# Patient Record
Sex: MALE | Race: WHITE | HISPANIC OR LATINO
[De-identification: names, ages, dates, MRNs, and addresses within clinical notes are randomized per-mention and may not be internally consistent; named-entity substitution may affect disease eponyms.]

---

## 2017-03-07 PROBLEM — Z00.00 ENCOUNTER FOR PREVENTIVE HEALTH EXAMINATION: Status: ACTIVE | Noted: 2017-03-07

## 2017-04-12 ENCOUNTER — NON-APPOINTMENT (OUTPATIENT)
Age: 67
End: 2017-04-12

## 2017-04-12 ENCOUNTER — APPOINTMENT (OUTPATIENT)
Dept: CARDIOLOGY | Facility: CLINIC | Age: 67
End: 2017-04-12

## 2017-04-12 VITALS
BODY MASS INDEX: 25.16 KG/M2 | SYSTOLIC BLOOD PRESSURE: 153 MMHG | RESPIRATION RATE: 17 BRPM | DIASTOLIC BLOOD PRESSURE: 96 MMHG | OXYGEN SATURATION: 96 % | HEIGHT: 68 IN | WEIGHT: 166 LBS | HEART RATE: 70 BPM

## 2017-04-12 VITALS — SYSTOLIC BLOOD PRESSURE: 134 MMHG | DIASTOLIC BLOOD PRESSURE: 80 MMHG

## 2017-04-12 DIAGNOSIS — F17.200 NICOTINE DEPENDENCE, UNSPECIFIED, UNCOMPLICATED: ICD-10-CM

## 2017-04-12 DIAGNOSIS — Z86.39 PERSONAL HISTORY OF OTHER ENDOCRINE, NUTRITIONAL AND METABOLIC DISEASE: ICD-10-CM

## 2017-04-12 DIAGNOSIS — L40.9 PSORIASIS, UNSPECIFIED: ICD-10-CM

## 2017-04-21 ENCOUNTER — APPOINTMENT (OUTPATIENT)
Dept: CARDIOLOGY | Facility: CLINIC | Age: 67
End: 2017-04-21

## 2017-04-26 ENCOUNTER — APPOINTMENT (OUTPATIENT)
Dept: CARDIOLOGY | Facility: CLINIC | Age: 67
End: 2017-04-26

## 2017-05-05 ENCOUNTER — APPOINTMENT (OUTPATIENT)
Dept: CARDIOLOGY | Facility: CLINIC | Age: 67
End: 2017-05-05

## 2017-07-18 ENCOUNTER — RX RENEWAL (OUTPATIENT)
Age: 67
End: 2017-07-18

## 2017-08-13 LAB
25(OH)D3 SERPL-MCNC: 33.8 NG/ML
ALBUMIN SERPL ELPH-MCNC: 4.3 G/DL
ALP BLD-CCNC: 82 U/L
ALT SERPL-CCNC: 22 U/L
ANION GAP SERPL CALC-SCNC: 20 MMOL/L
AST SERPL-CCNC: 20 U/L
BASOPHILS # BLD AUTO: 0.04 K/UL
BASOPHILS NFR BLD AUTO: 0.5 %
BILIRUB SERPL-MCNC: 1 MG/DL
BUN SERPL-MCNC: 19 MG/DL
CALCIUM SERPL-MCNC: 10 MG/DL
CHLORIDE SERPL-SCNC: 101 MMOL/L
CHOLEST SERPL-MCNC: 186 MG/DL
CHOLEST/HDLC SERPL: 4.3 RATIO
CO2 SERPL-SCNC: 23 MMOL/L
CREAT SERPL-MCNC: 1.29 MG/DL
EOSINOPHIL # BLD AUTO: 0.17 K/UL
EOSINOPHIL NFR BLD AUTO: 2 %
FOLATE SERPL-MCNC: 5.4 NG/ML
GLUCOSE SERPL-MCNC: 93 MG/DL
HBA1C MFR BLD HPLC: 6.2 %
HCT VFR BLD CALC: 49.1 %
HDLC SERPL-MCNC: 43 MG/DL
HGB BLD-MCNC: 16.6 G/DL
IMM GRANULOCYTES NFR BLD AUTO: 0.2 %
LDLC SERPL CALC-MCNC: 119 MG/DL
LYMPHOCYTES # BLD AUTO: 2.1 K/UL
LYMPHOCYTES NFR BLD AUTO: 24.5 %
MAN DIFF?: NORMAL
MCHC RBC-ENTMCNC: 29.7 PG
MCHC RBC-ENTMCNC: 33.8 GM/DL
MCV RBC AUTO: 87.8 FL
MONOCYTES # BLD AUTO: 0.72 K/UL
MONOCYTES NFR BLD AUTO: 8.4 %
NEUTROPHILS # BLD AUTO: 5.52 K/UL
NEUTROPHILS NFR BLD AUTO: 64.4 %
PLATELET # BLD AUTO: 218 K/UL
POTASSIUM SERPL-SCNC: 5 MMOL/L
PROT SERPL-MCNC: 7.1 G/DL
RBC # BLD: 5.59 M/UL
RBC # FLD: 14 %
SODIUM SERPL-SCNC: 144 MMOL/L
TRIGL SERPL-MCNC: 121 MG/DL
TSH SERPL-ACNC: 2.36 UIU/ML
VIT B12 SERPL-MCNC: 682 PG/ML
WBC # FLD AUTO: 8.57 K/UL

## 2017-08-16 ENCOUNTER — NON-APPOINTMENT (OUTPATIENT)
Age: 67
End: 2017-08-16

## 2017-08-16 ENCOUNTER — APPOINTMENT (OUTPATIENT)
Dept: CARDIOLOGY | Facility: CLINIC | Age: 67
End: 2017-08-16
Payer: MEDICARE

## 2017-08-16 VITALS
HEIGHT: 68 IN | DIASTOLIC BLOOD PRESSURE: 79 MMHG | WEIGHT: 168 LBS | SYSTOLIC BLOOD PRESSURE: 145 MMHG | OXYGEN SATURATION: 96 % | RESPIRATION RATE: 17 BRPM | TEMPERATURE: 97.9 F | BODY MASS INDEX: 25.46 KG/M2 | HEART RATE: 71 BPM

## 2017-08-16 DIAGNOSIS — R00.2 PALPITATIONS: ICD-10-CM

## 2017-08-16 PROCEDURE — 93000 ELECTROCARDIOGRAM COMPLETE: CPT

## 2017-08-16 PROCEDURE — 99215 OFFICE O/P EST HI 40 MIN: CPT

## 2017-10-25 ENCOUNTER — RX RENEWAL (OUTPATIENT)
Age: 67
End: 2017-10-25

## 2017-11-20 ENCOUNTER — MEDICATION RENEWAL (OUTPATIENT)
Age: 67
End: 2017-11-20

## 2017-12-18 ENCOUNTER — RX RENEWAL (OUTPATIENT)
Age: 67
End: 2017-12-18

## 2018-08-01 ENCOUNTER — RX RENEWAL (OUTPATIENT)
Age: 68
End: 2018-08-01

## 2018-11-05 ENCOUNTER — RX RENEWAL (OUTPATIENT)
Age: 68
End: 2018-11-05

## 2019-01-08 ENCOUNTER — RX RENEWAL (OUTPATIENT)
Age: 69
End: 2019-01-08

## 2019-03-06 ENCOUNTER — APPOINTMENT (OUTPATIENT)
Dept: CARDIOLOGY | Facility: CLINIC | Age: 69
End: 2019-03-06
Payer: MEDICARE

## 2019-03-06 ENCOUNTER — NON-APPOINTMENT (OUTPATIENT)
Age: 69
End: 2019-03-06

## 2019-03-06 VITALS
HEART RATE: 61 BPM | WEIGHT: 158 LBS | RESPIRATION RATE: 17 BRPM | HEIGHT: 68 IN | SYSTOLIC BLOOD PRESSURE: 133 MMHG | DIASTOLIC BLOOD PRESSURE: 80 MMHG | BODY MASS INDEX: 23.95 KG/M2 | TEMPERATURE: 97.9 F | OXYGEN SATURATION: 100 %

## 2019-03-06 PROCEDURE — 93000 ELECTROCARDIOGRAM COMPLETE: CPT

## 2019-03-06 PROCEDURE — 99214 OFFICE O/P EST MOD 30 MIN: CPT

## 2019-03-06 NOTE — DISCUSSION/SUMMARY
[FreeTextEntry1] : 68 year man with a history as listed presents for an initial cardiac evaluation. \par Srinivasan is complaining of dyspnea on exertion. He never got his ischemic workup from before.  His EKG did not reveal any significant ischemic changes. \par He will get a 2d echo to rule out underlying structural heart disease. He will undergo a nuclear stress test to rule out underlying CAD. \par Continue with Plavix. He takes ASA 3 x a week. \par His blood pressure is borderline. He will continue with Atenolol 25mg q12 for now. He is very reluctant to start any new medications. \par Continue his Lipitor 40mg HS. Goal LDL<70. \par He will have his baseline lab work, including lipids, done prior to the next visit. \par Check for progression of carotid disease. \par Exercise and diet counseling was performed in order to reduce her future cardiovascular risk. \par Smoking cessation counseling was performed. \par He will followup with me in 3 months  or sooner if necessary. \par

## 2019-03-06 NOTE — PHYSICAL EXAM
[Well Groomed] : well groomed [General Appearance - In No Acute Distress] : no acute distress [Normal Conjunctiva] : the conjunctiva exhibited no abnormalities [Eyelids - No Xanthelasma] : the eyelids demonstrated no xanthelasmas [Normal Oral Mucosa] : normal oral mucosa [No Oral Pallor] : no oral pallor [No Oral Cyanosis] : no oral cyanosis [Respiration, Rhythm And Depth] : normal respiratory rhythm and effort [Exaggerated Use Of Accessory Muscles For Inspiration] : no accessory muscle use [Auscultation Breath Sounds / Voice Sounds] : lungs were clear to auscultation bilaterally [Abdomen Soft] : soft [Abdomen Tenderness] : non-tender [Abdomen Mass (___ Cm)] : no abdominal mass palpated [Abnormal Walk] : normal gait [Gait - Sufficient For Exercise Testing] : the gait was sufficient for exercise testing [Nail Clubbing] : no clubbing of the fingernails [Cyanosis, Localized] : no localized cyanosis [Petechial Hemorrhages (___cm)] : no petechial hemorrhages [] : no ischemic changes [FreeTextEntry1] : psoriasis  [Oriented To Time, Place, And Person] : oriented to person, place, and time [Affect] : the affect was normal [Mood] : the mood was normal [No Anxiety] : not feeling anxious [Normal Appearance] : was normal in appearance [Neck Supple] : was supple [Diffusely Enlarged] : was not enlarged [Normal Rate] : normal [Rhythm Regular] : regular [Normal S1] : normal S1 [Normal S2] : normal S2 [No Gallop] : no gallop heard [No Murmur] : no murmurs heard [1+] : Carotid: right 1+ [Right Carotid Bruit] : no bruit heard over the right carotid [Left Carotid Bruit] : no bruit heard over the left carotid [2+] : left 2+ [Bruit] : no bruit heard [No Pitting Edema] : no pitting edema present
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
## 2019-03-07 ENCOUNTER — TRANSCRIPTION ENCOUNTER (OUTPATIENT)
Age: 69
End: 2019-03-07

## 2019-03-07 LAB
25(OH)D3 SERPL-MCNC: 24.6 NG/ML
ALBUMIN SERPL ELPH-MCNC: 4.7 G/DL
ALP BLD-CCNC: 78 U/L
ALT SERPL-CCNC: 19 U/L
ANION GAP SERPL CALC-SCNC: 13 MMOL/L
AST SERPL-CCNC: 20 U/L
BASOPHILS # BLD AUTO: 0.09 K/UL
BASOPHILS NFR BLD AUTO: 1.1 %
BILIRUB SERPL-MCNC: 0.9 MG/DL
BUN SERPL-MCNC: 14 MG/DL
CALCIUM SERPL-MCNC: 9.7 MG/DL
CHLORIDE SERPL-SCNC: 102 MMOL/L
CHOLEST SERPL-MCNC: 155 MG/DL
CHOLEST/HDLC SERPL: 3.4 RATIO
CO2 SERPL-SCNC: 25 MMOL/L
CREAT SERPL-MCNC: 0.91 MG/DL
EOSINOPHIL # BLD AUTO: 0.18 K/UL
EOSINOPHIL NFR BLD AUTO: 2.1 %
FOLATE SERPL-MCNC: 9.2 NG/ML
GLUCOSE SERPL-MCNC: 73 MG/DL
HBA1C MFR BLD HPLC: 6.2 %
HCT VFR BLD CALC: 48.6 %
HDLC SERPL-MCNC: 45 MG/DL
HGB BLD-MCNC: 16.4 G/DL
IMM GRANULOCYTES NFR BLD AUTO: 0.2 %
LDLC SERPL CALC-MCNC: 83 MG/DL
LYMPHOCYTES # BLD AUTO: 2.42 K/UL
LYMPHOCYTES NFR BLD AUTO: 28.8 %
MAGNESIUM SERPL-MCNC: 1.9 MG/DL
MAN DIFF?: NORMAL
MCHC RBC-ENTMCNC: 30.3 PG
MCHC RBC-ENTMCNC: 33.7 GM/DL
MCV RBC AUTO: 89.7 FL
MONOCYTES # BLD AUTO: 0.78 K/UL
MONOCYTES NFR BLD AUTO: 9.3 %
NEUTROPHILS # BLD AUTO: 4.91 K/UL
NEUTROPHILS NFR BLD AUTO: 58.5 %
PLATELET # BLD AUTO: 219 K/UL
POTASSIUM SERPL-SCNC: 5 MMOL/L
PROT SERPL-MCNC: 6.9 G/DL
RBC # BLD: 5.42 M/UL
RBC # FLD: 13 %
SODIUM SERPL-SCNC: 140 MMOL/L
TRIGL SERPL-MCNC: 135 MG/DL
TSH SERPL-ACNC: 2.08 UIU/ML
VIT B12 SERPL-MCNC: 563 PG/ML
WBC # FLD AUTO: 8.4 K/UL

## 2019-04-10 ENCOUNTER — MEDICATION RENEWAL (OUTPATIENT)
Age: 69
End: 2019-04-10

## 2019-06-05 ENCOUNTER — NON-APPOINTMENT (OUTPATIENT)
Age: 69
End: 2019-06-05

## 2019-06-05 ENCOUNTER — APPOINTMENT (OUTPATIENT)
Dept: CARDIOLOGY | Facility: CLINIC | Age: 69
End: 2019-06-05
Payer: MEDICARE

## 2019-06-05 VITALS
OXYGEN SATURATION: 98 % | HEART RATE: 57 BPM | BODY MASS INDEX: 23.49 KG/M2 | HEIGHT: 68 IN | WEIGHT: 155 LBS | SYSTOLIC BLOOD PRESSURE: 130 MMHG | DIASTOLIC BLOOD PRESSURE: 77 MMHG | TEMPERATURE: 98.1 F | RESPIRATION RATE: 17 BRPM

## 2019-06-05 VITALS — DIASTOLIC BLOOD PRESSURE: 70 MMHG | SYSTOLIC BLOOD PRESSURE: 128 MMHG

## 2019-06-05 PROCEDURE — 93000 ELECTROCARDIOGRAM COMPLETE: CPT

## 2019-06-05 PROCEDURE — 99214 OFFICE O/P EST MOD 30 MIN: CPT
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
## 2019-06-05 NOTE — DISCUSSION/SUMMARY
[FreeTextEntry1] : 68 year man with a history as listed presents for an initial cardiac evaluation. \par Srinivasan is relatively doing well. He is still complaining of dyspnea on exertion which is likely related to deconditioning.  His EKG did not reveal any significant ischemic changes. He never got his ischemic workup from before. He will get a 2d echo to rule out underlying structural heart disease. He will undergo a nuclear stress test to rule out underlying CAD. \par Continue with Plavix.  He will stop ASA. \par His blood pressure is controlled. He will continue with Atenolol 25mg q12. \par Continue his Lipitor 40mg HS. Goal LDL<70. \par Exercise and diet counseling was performed in order to reduce her future cardiovascular risk. \par Smoking cessation counseling was performed. \par He will followup with me in 3 months  or sooner if necessary. \par

## 2019-06-05 NOTE — PHYSICAL EXAM
[Normal Conjunctiva] : the conjunctiva exhibited no abnormalities [Well Groomed] : well groomed [General Appearance - In No Acute Distress] : no acute distress [Eyelids - No Xanthelasma] : the eyelids demonstrated no xanthelasmas [Normal Oral Mucosa] : normal oral mucosa [No Oral Cyanosis] : no oral cyanosis [No Oral Pallor] : no oral pallor [Respiration, Rhythm And Depth] : normal respiratory rhythm and effort [Exaggerated Use Of Accessory Muscles For Inspiration] : no accessory muscle use [Auscultation Breath Sounds / Voice Sounds] : lungs were clear to auscultation bilaterally [Abdomen Soft] : soft [Abdomen Tenderness] : non-tender [Abdomen Mass (___ Cm)] : no abdominal mass palpated [Abnormal Walk] : normal gait [Gait - Sufficient For Exercise Testing] : the gait was sufficient for exercise testing [Cyanosis, Localized] : no localized cyanosis [Nail Clubbing] : no clubbing of the fingernails [Petechial Hemorrhages (___cm)] : no petechial hemorrhages [] : no ischemic changes [FreeTextEntry1] : psoriasis  [Oriented To Time, Place, And Person] : oriented to person, place, and time [Affect] : the affect was normal [Mood] : the mood was normal [No Anxiety] : not feeling anxious [Normal Rate] : normal [Rhythm Regular] : regular [Normal S2] : normal S2 [Normal S1] : normal S1 [No Gallop] : no gallop heard [No Murmur] : no murmurs heard [1+] : Carotid: right 1+ [Right Carotid Bruit] : no bruit heard over the right carotid [Left Carotid Bruit] : no bruit heard over the left carotid [2+] : right 2+ [No Pitting Edema] : no pitting edema present [Bruit] : no bruit heard

## 2019-06-19 ENCOUNTER — APPOINTMENT (OUTPATIENT)
Dept: CARDIOLOGY | Facility: CLINIC | Age: 69
End: 2019-06-19
Payer: MEDICARE

## 2019-06-19 PROCEDURE — 93306 TTE W/DOPPLER COMPLETE: CPT

## 2019-06-24 ENCOUNTER — APPOINTMENT (OUTPATIENT)
Dept: CARDIOLOGY | Facility: CLINIC | Age: 69
End: 2019-06-24

## 2019-09-18 ENCOUNTER — APPOINTMENT (OUTPATIENT)
Dept: CARDIOLOGY | Facility: CLINIC | Age: 69
End: 2019-09-18
Payer: MEDICARE

## 2019-09-18 ENCOUNTER — NON-APPOINTMENT (OUTPATIENT)
Age: 69
End: 2019-09-18

## 2019-09-18 VITALS — DIASTOLIC BLOOD PRESSURE: 78 MMHG | SYSTOLIC BLOOD PRESSURE: 124 MMHG

## 2019-09-18 VITALS
SYSTOLIC BLOOD PRESSURE: 136 MMHG | DIASTOLIC BLOOD PRESSURE: 80 MMHG | HEIGHT: 68 IN | HEART RATE: 50 BPM | BODY MASS INDEX: 24.25 KG/M2 | WEIGHT: 160 LBS | OXYGEN SATURATION: 98 %

## 2019-09-18 PROCEDURE — 99214 OFFICE O/P EST MOD 30 MIN: CPT

## 2019-09-18 PROCEDURE — 93000 ELECTROCARDIOGRAM COMPLETE: CPT

## 2019-09-18 NOTE — HISTORY OF PRESENT ILLNESS
[FreeTextEntry1] : 68 year old man with a history CAd s/p MI in 1990 s/p TPA, s/p PCI 1991 at [facility] in [facility], 2006 at [facility] with PCI to LAD,  closed RCA, carotid disease, HTN, HLD, tobacco. \par He had an echo in 6/19/2019  that showed normal systolic LV function without any significant other findings, including no significant valvular disease. \par \par He is here for a followup visit. He was last here in 6/2019.\par Since his last visit he has been feeling well. He has not gotten his stress test. \par  \par He still complains of intermittent anxiety that overtakes him. He notes a fluttering for a seconds during that  This seems to happen every few months which has improved. \par  He complains of dyspnea on exertion with carrying objects upstairs.  He denies any chest pain, PND, orthopnea, lower extremity edema, near syncope, syncope, strokelike symptoms.  \par He is complaint with his medications.

## 2019-09-18 NOTE — DISCUSSION/SUMMARY
[FreeTextEntry1] : 68 year man with a history as listed presents for an initial cardiac evaluation. \par Srinivasan is relatively doing well. He is still complaining of dyspnea on exertion which is likely related to deconditioning.  His EKG did not reveal any significant ischemic changes. He had an echo in 6/19/2019  that showed normal systolic LV function without any significant other findings, including no significant valvular disease. He never got his ischemic workup from before.  He has agreed to undergo a nuclear stress test to rule out underlying CAD. \par Continue with Plavix.  He will stop ASA. \par His blood pressure is controlled. He will continue with Atenolol 25mg q12. \par Continue his Lipitor 40mg HS. Goal LDL<70. \par Exercise and diet counseling was performed in order to reduce her future cardiovascular risk. \par Smoking cessation counseling was performed. \par He will followup with me in 3 months  or sooner if necessary. \par

## 2019-09-18 NOTE — PHYSICAL EXAM
[Well Groomed] : well groomed [Normal Conjunctiva] : the conjunctiva exhibited no abnormalities [General Appearance - In No Acute Distress] : no acute distress [Eyelids - No Xanthelasma] : the eyelids demonstrated no xanthelasmas [No Oral Pallor] : no oral pallor [Normal Oral Mucosa] : normal oral mucosa [No Oral Cyanosis] : no oral cyanosis [Respiration, Rhythm And Depth] : normal respiratory rhythm and effort [Auscultation Breath Sounds / Voice Sounds] : lungs were clear to auscultation bilaterally [Exaggerated Use Of Accessory Muscles For Inspiration] : no accessory muscle use [Abdomen Soft] : soft [Abdomen Tenderness] : non-tender [Abdomen Mass (___ Cm)] : no abdominal mass palpated [Abnormal Walk] : normal gait [Gait - Sufficient For Exercise Testing] : the gait was sufficient for exercise testing [Cyanosis, Localized] : no localized cyanosis [Nail Clubbing] : no clubbing of the fingernails [] : no ischemic changes [Petechial Hemorrhages (___cm)] : no petechial hemorrhages [FreeTextEntry1] : psoriasis  [Oriented To Time, Place, And Person] : oriented to person, place, and time [Mood] : the mood was normal [Affect] : the affect was normal [No Anxiety] : not feeling anxious [Normal Rate] : normal [Rhythm Regular] : regular [Normal S1] : normal S1 [Normal S2] : normal S2 [No Murmur] : no murmurs heard [No Gallop] : no gallop heard [1+] : Carotid: right 1+ [Left Carotid Bruit] : no bruit heard over the left carotid [Right Carotid Bruit] : no bruit heard over the right carotid [2+] : left 2+ [No Pitting Edema] : no pitting edema present [Bruit] : no bruit heard

## 2019-09-19 ENCOUNTER — CLINICAL ADVICE (OUTPATIENT)
Age: 69
End: 2019-09-19

## 2019-11-04 ENCOUNTER — RX RENEWAL (OUTPATIENT)
Age: 69
End: 2019-11-04

## 2020-04-07 ENCOUNTER — RX RENEWAL (OUTPATIENT)
Age: 70
End: 2020-04-07

## 2020-04-08 ENCOUNTER — RX RENEWAL (OUTPATIENT)
Age: 70
End: 2020-04-08

## 2020-05-08 ENCOUNTER — RX RENEWAL (OUTPATIENT)
Age: 70
End: 2020-05-08

## 2020-07-08 ENCOUNTER — TRANSCRIPTION ENCOUNTER (OUTPATIENT)
Age: 70
End: 2020-07-08

## 2020-10-29 ENCOUNTER — RX RENEWAL (OUTPATIENT)
Age: 70
End: 2020-10-29

## 2020-11-16 ENCOUNTER — RX RENEWAL (OUTPATIENT)
Age: 70
End: 2020-11-16

## 2021-01-20 ENCOUNTER — TRANSCRIPTION ENCOUNTER (OUTPATIENT)
Age: 71
End: 2021-01-20

## 2021-02-08 ENCOUNTER — RX RENEWAL (OUTPATIENT)
Age: 71
End: 2021-02-08

## 2021-02-09 ENCOUNTER — RX RENEWAL (OUTPATIENT)
Age: 71
End: 2021-02-09

## 2021-04-19 ENCOUNTER — RX RENEWAL (OUTPATIENT)
Age: 71
End: 2021-04-19

## 2021-05-05 ENCOUNTER — RX RENEWAL (OUTPATIENT)
Age: 71
End: 2021-05-05

## 2021-05-13 ENCOUNTER — NON-APPOINTMENT (OUTPATIENT)
Age: 71
End: 2021-05-13

## 2021-05-13 ENCOUNTER — APPOINTMENT (OUTPATIENT)
Dept: CARDIOLOGY | Facility: CLINIC | Age: 71
End: 2021-05-13
Payer: MEDICARE

## 2021-05-13 VITALS
SYSTOLIC BLOOD PRESSURE: 138 MMHG | DIASTOLIC BLOOD PRESSURE: 80 MMHG | HEIGHT: 68 IN | HEART RATE: 62 BPM | BODY MASS INDEX: 23.49 KG/M2 | WEIGHT: 155 LBS | OXYGEN SATURATION: 97 %

## 2021-05-13 PROCEDURE — 93000 ELECTROCARDIOGRAM COMPLETE: CPT

## 2021-05-13 PROCEDURE — 99214 OFFICE O/P EST MOD 30 MIN: CPT

## 2021-05-13 NOTE — CARDIOLOGY SUMMARY
[Normal] : normal [___] : [unfilled] [None] : normal LV function [de-identified] : SR [de-identified] : 6/2019 normal LV function.

## 2021-05-13 NOTE — DISCUSSION/SUMMARY
[FreeTextEntry1] : 70 year man with a history as listed presents for a followup cardiac evaluation. \par Srinivasan is relatively doing well. He is still complaining of dyspnea on exertion which is likely related to deconditioning.  His EKG did not reveal any significant ischemic changes. He will get a 2d echo to assess for any  new structural heart disease, changes in valvular and ventricular function. He will undergo a nuclear stress test to reassess his CAD. he will need his carotid duplex. \par Continue with Plavix.  He will stop ASA. \par His blood pressure is controlled. He will continue with Atenolol 25mg q12. \par Continue his Lipitor 40mg HS. Goal LDL<70. \par Exercise and diet counseling was performed in order to reduce her future cardiovascular risk. \par Smoking cessation counseling was performed. \par He will followup with me in 4-6 months  or sooner if necessary. \par

## 2021-05-13 NOTE — PHYSICAL EXAM
[Well Groomed] : well groomed [General Appearance - In No Acute Distress] : no acute distress [Normal Conjunctiva] : the conjunctiva exhibited no abnormalities [Eyelids - No Xanthelasma] : the eyelids demonstrated no xanthelasmas [Normal Oral Mucosa] : normal oral mucosa [No Oral Pallor] : no oral pallor [No Oral Cyanosis] : no oral cyanosis [Respiration, Rhythm And Depth] : normal respiratory rhythm and effort [Exaggerated Use Of Accessory Muscles For Inspiration] : no accessory muscle use [Auscultation Breath Sounds / Voice Sounds] : lungs were clear to auscultation bilaterally [Abdomen Soft] : soft [Abdomen Tenderness] : non-tender [Abdomen Mass (___ Cm)] : no abdominal mass palpated [Abnormal Walk] : normal gait [Gait - Sufficient For Exercise Testing] : the gait was sufficient for exercise testing [Nail Clubbing] : no clubbing of the fingernails [Cyanosis, Localized] : no localized cyanosis [Petechial Hemorrhages (___cm)] : no petechial hemorrhages [] : no ischemic changes [Oriented To Time, Place, And Person] : oriented to person, place, and time [Affect] : the affect was normal [Mood] : the mood was normal [No Anxiety] : not feeling anxious [Normal Rate] : normal [Rhythm Regular] : regular [Normal S1] : normal S1 [Normal S2] : normal S2 [No Gallop] : no gallop heard [No Murmur] : no murmurs heard [1+] : Carotid: right 1+ [2+] : left 2+ [No Pitting Edema] : no pitting edema present [FreeTextEntry1] : psoriasis  [Right Carotid Bruit] : no bruit heard over the right carotid [Left Carotid Bruit] : no bruit heard over the left carotid [Bruit] : no bruit heard

## 2021-05-13 NOTE — COUNSELING
[Risk of tobacco use and health benefits of smoking cessation discussed] : Risk of tobacco use and health benefits of smoking cessation discussed [Cessation strategies including cessation program discussed] : Cessation strategies including cessation program discussed [Willing to Quit Smoking] : Willing to quit smoking [FreeTextEntry1] : 3

## 2021-05-21 LAB
FOLATE SERPL-MCNC: 6.9 NG/ML
VIT B12 SERPL-MCNC: 499 PG/ML

## 2021-05-26 ENCOUNTER — RX RENEWAL (OUTPATIENT)
Age: 71
End: 2021-05-26

## 2021-06-02 LAB
25(OH)D3 SERPL-MCNC: 26.7 NG/ML
ALBUMIN SERPL ELPH-MCNC: 4.3 G/DL
ALP BLD-CCNC: 73 U/L
ALT SERPL-CCNC: 30 U/L
ANION GAP SERPL CALC-SCNC: 13 MMOL/L
AST SERPL-CCNC: 23 U/L
BASOPHILS # BLD AUTO: 0.08 K/UL
BASOPHILS NFR BLD AUTO: 1.1 %
BILIRUB SERPL-MCNC: 1 MG/DL
BUN SERPL-MCNC: 18 MG/DL
CALCIUM SERPL-MCNC: 9.4 MG/DL
CHLORIDE SERPL-SCNC: 104 MMOL/L
CHOLEST SERPL-MCNC: 138 MG/DL
CO2 SERPL-SCNC: 24 MMOL/L
CREAT SERPL-MCNC: 1.21 MG/DL
EOSINOPHIL # BLD AUTO: 0.17 K/UL
EOSINOPHIL NFR BLD AUTO: 2.4 %
ESTIMATED AVERAGE GLUCOSE: 131 MG/DL
GLUCOSE SERPL-MCNC: 114 MG/DL
HBA1C MFR BLD HPLC: 6.2 %
HCT VFR BLD CALC: 46.3 %
HDLC SERPL-MCNC: 38 MG/DL
HGB BLD-MCNC: 15.8 G/DL
IMM GRANULOCYTES NFR BLD AUTO: 0.1 %
LDLC SERPL CALC-MCNC: 80 MG/DL
LYMPHOCYTES # BLD AUTO: 1.79 K/UL
LYMPHOCYTES NFR BLD AUTO: 25.2 %
MAGNESIUM SERPL-MCNC: 2 MG/DL
MAN DIFF?: NORMAL
MCHC RBC-ENTMCNC: 29.6 PG
MCHC RBC-ENTMCNC: 34.1 GM/DL
MCV RBC AUTO: 86.9 FL
MONOCYTES # BLD AUTO: 0.63 K/UL
MONOCYTES NFR BLD AUTO: 8.9 %
NEUTROPHILS # BLD AUTO: 4.42 K/UL
NEUTROPHILS NFR BLD AUTO: 62.3 %
NONHDLC SERPL-MCNC: 100 MG/DL
PLATELET # BLD AUTO: 188 K/UL
POTASSIUM SERPL-SCNC: 4.6 MMOL/L
PROT SERPL-MCNC: 6.4 G/DL
PSA SERPL-MCNC: 1.69 NG/ML
RBC # BLD: 5.33 M/UL
RBC # FLD: 12.7 %
SODIUM SERPL-SCNC: 141 MMOL/L
TRIGL SERPL-MCNC: 102 MG/DL
TSH SERPL-ACNC: 1.59 UIU/ML
WBC # FLD AUTO: 7.1 K/UL

## 2021-07-13 ENCOUNTER — APPOINTMENT (OUTPATIENT)
Dept: CARDIOLOGY | Facility: CLINIC | Age: 71
End: 2021-07-13
Payer: MEDICARE

## 2021-07-13 PROCEDURE — 93306 TTE W/DOPPLER COMPLETE: CPT

## 2021-07-13 PROCEDURE — 93880 EXTRACRANIAL BILAT STUDY: CPT

## 2021-08-10 ENCOUNTER — APPOINTMENT (OUTPATIENT)
Dept: ORTHOPEDIC SURGERY | Facility: CLINIC | Age: 71
End: 2021-08-10
Payer: MEDICARE

## 2021-08-10 DIAGNOSIS — M65.341 TRIGGER FINGER, RIGHT RING FINGER: ICD-10-CM

## 2021-08-10 PROCEDURE — 99204 OFFICE O/P NEW MOD 45 MIN: CPT | Mod: 25

## 2021-08-10 PROCEDURE — 20550 NJX 1 TENDON SHEATH/LIGAMENT: CPT | Mod: F8

## 2021-09-30 ENCOUNTER — NON-APPOINTMENT (OUTPATIENT)
Age: 71
End: 2021-09-30

## 2021-09-30 ENCOUNTER — APPOINTMENT (OUTPATIENT)
Dept: CARDIOLOGY | Facility: CLINIC | Age: 71
End: 2021-09-30
Payer: MEDICARE

## 2021-09-30 VITALS
WEIGHT: 156 LBS | HEART RATE: 70 BPM | HEIGHT: 68 IN | DIASTOLIC BLOOD PRESSURE: 75 MMHG | OXYGEN SATURATION: 96 % | SYSTOLIC BLOOD PRESSURE: 161 MMHG | BODY MASS INDEX: 23.64 KG/M2

## 2021-09-30 VITALS — SYSTOLIC BLOOD PRESSURE: 124 MMHG | DIASTOLIC BLOOD PRESSURE: 70 MMHG

## 2021-09-30 PROCEDURE — 99214 OFFICE O/P EST MOD 30 MIN: CPT

## 2021-09-30 PROCEDURE — 93000 ELECTROCARDIOGRAM COMPLETE: CPT

## 2021-09-30 NOTE — CARDIOLOGY SUMMARY
[de-identified] : SR [de-identified] : 6/2019 normal LV function.\par 7/2021 normal LV function.  [de-identified] : 7/2021 carotid disease RCA severe atherosclerosis w 80-99%

## 2021-09-30 NOTE — DISCUSSION/SUMMARY
[FreeTextEntry1] : 70 year man with a history as listed presents for a followup cardiac evaluation. \par Srinivasan is relatively doing well. He is still complaining of dyspnea on exertion which is likely related to deconditioning.  His EKG did not reveal any significant ischemic changes. He is pending a nuclear stress test. \par I have refered him to a vascular surgeon for his carotid stenosis. \par Continue with Plavix.  He will stop ASA. \par His blood pressure is controlled. He will continue with Atenolol 25mg q12. \par Continue his Lipitor 40mg HS. Goal LDL<70. \par Exercise and diet counseling was performed in order to reduce her future cardiovascular risk. \par Smoking cessation counseling was performed. \par He will followup with me in  6 months  or sooner if necessary. \par

## 2021-09-30 NOTE — HISTORY OF PRESENT ILLNESS
[FreeTextEntry1] : 70 year old man with a history CAd s/p MI in 1990 s/p TPA, s/p PCI 1991 at [facility] [place], 2006 at [facility] with PCI to LAD,  closed RCA, carotid disease, HTN, HLD, tobacco. \par \par He is here for a followup visit. He was last here in 9/2019.\par Since his last visit he has been feeling well.  Though he has been more concerned about his daughter who is graduating the [employer].  \par He has been having a flair from his psoriasis. \par  he walks with his dogs. He complains of dyspnea on exertion with strenuous exertion.  He denies any chest pain, PND, orthopnea, lower extremity edema, near syncope, syncope, strokelike symptoms.  he is still dealing the health issues with his grandson. He is complaint with his medications.

## 2021-10-13 ENCOUNTER — APPOINTMENT (OUTPATIENT)
Dept: CARDIOLOGY | Facility: CLINIC | Age: 71
End: 2021-10-13

## 2022-01-25 ENCOUNTER — APPOINTMENT (OUTPATIENT)
Dept: CARDIOLOGY | Facility: CLINIC | Age: 72
End: 2022-01-25

## 2022-07-21 ENCOUNTER — APPOINTMENT (OUTPATIENT)
Dept: CARDIOLOGY | Facility: CLINIC | Age: 72
End: 2022-07-21

## 2022-07-21 ENCOUNTER — NON-APPOINTMENT (OUTPATIENT)
Age: 72
End: 2022-07-21

## 2022-07-21 VITALS
OXYGEN SATURATION: 97 % | BODY MASS INDEX: 22.13 KG/M2 | WEIGHT: 146 LBS | DIASTOLIC BLOOD PRESSURE: 75 MMHG | HEART RATE: 68 BPM | SYSTOLIC BLOOD PRESSURE: 130 MMHG | HEIGHT: 68 IN

## 2022-07-21 PROCEDURE — 99214 OFFICE O/P EST MOD 30 MIN: CPT

## 2022-07-21 PROCEDURE — 93000 ELECTROCARDIOGRAM COMPLETE: CPT

## 2022-07-21 NOTE — PHYSICAL EXAM
[Well Groomed] : well groomed [General Appearance - In No Acute Distress] : no acute distress [Normal Conjunctiva] : the conjunctiva exhibited no abnormalities [Eyelids - No Xanthelasma] : the eyelids demonstrated no xanthelasmas [Normal Oral Mucosa] : normal oral mucosa [No Oral Pallor] : no oral pallor [No Oral Cyanosis] : no oral cyanosis [Respiration, Rhythm And Depth] : normal respiratory rhythm and effort [Exaggerated Use Of Accessory Muscles For Inspiration] : no accessory muscle use [Auscultation Breath Sounds / Voice Sounds] : lungs were clear to auscultation bilaterally [Abdomen Soft] : soft [Abdomen Tenderness] : non-tender [Abdomen Mass (___ Cm)] : no abdominal mass palpated [Abnormal Walk] : normal gait [Gait - Sufficient For Exercise Testing] : the gait was sufficient for exercise testing [Nail Clubbing] : no clubbing of the fingernails [Cyanosis, Localized] : no localized cyanosis [Petechial Hemorrhages (___cm)] : no petechial hemorrhages [] : no ischemic changes [FreeTextEntry1] : psoriasis  [Oriented To Time, Place, And Person] : oriented to person, place, and time [Affect] : the affect was normal [Mood] : the mood was normal [No Anxiety] : not feeling anxious [Normal Rate] : normal [Rhythm Regular] : regular [Normal S1] : normal S1 [Normal S2] : normal S2 [No Gallop] : no gallop heard [No Murmur] : no murmurs heard [1+] : Carotid: right 1+ [Right Carotid Bruit] : no bruit heard over the right carotid [Left Carotid Bruit] : no bruit heard over the left carotid [2+] : left 2+ [Bruit] : no bruit heard [No Pitting Edema] : no pitting edema present

## 2022-07-21 NOTE — HISTORY OF PRESENT ILLNESS
[FreeTextEntry1] : 70 year old man with a history CAd s/p MI in 1990 s/p TPA, s/p PCI 1991 at [facility] in [facility], 2006 at  with PCI to LAD,  closed RCA, carotid disease, HTN, HLD, tobacco. \par \par He is here for a followup visit. He was last here in 9/2021\par Since his last visit he lost his brother to COVID. He was placed on Otelza but had to stop 2/2 hives. He now is trying biologics. \par Intermittently he has right sided pinpoint chest pain that is self limiting, lasts for minutes, happens about once a  week, nonradiating, nonexertional. He has been more active. He is working on the backyard. \par He has been having some weight loss. He complains of dyspnea on exertion with strenuous exertion.  He denies any chest pain, PND, orthopnea, lower extremity edema, near syncope, syncope, strokelike symptoms.  he is still dealing the health issues with his grandson. He is complaint with his medications.

## 2022-07-21 NOTE — CARDIOLOGY SUMMARY
[de-identified] : Sinus  Rhythm \par -  Nonspecific T-abnormality.  [de-identified] : 6/2019 normal LV function.\par 7/2021 normal LV function.  [de-identified] : 7/2021 carotid disease RCA severe atherosclerosis w 80-99%

## 2022-07-21 NOTE — DISCUSSION/SUMMARY
[FreeTextEntry1] : 70 year man with a history as listed presents for a followup cardiac evaluation. \par Srinivasan is had nonanginal chest pain.  His EKG did not reveal any significant ischemic changes. He is still pending a nuclear stress test. It has been hard to get him to be compliant with his appointments. \par He never went to the a vascular surgeon for his carotid stenosis. He will make another appointment. \par Continue with Plavix.  He will stop ASA. \par His blood pressure is controlled. He will continue with Atenolol 25mg q12. \par Continue his Lipitor 40mg HS. Goal LDL<70. \par Exercise and diet counseling was performed in order to reduce her future cardiovascular risk. \par Smoking cessation counseling was performed. \par He will followup with me in  6 months  or sooner if necessary. \par

## 2022-08-03 ENCOUNTER — APPOINTMENT (OUTPATIENT)
Dept: CARDIOLOGY | Facility: CLINIC | Age: 72
End: 2022-08-03

## 2022-08-03 ENCOUNTER — APPOINTMENT (OUTPATIENT)
Dept: VASCULAR SURGERY | Facility: CLINIC | Age: 72
End: 2022-08-03

## 2022-11-08 DIAGNOSIS — H53.139 SUDDEN VISUAL LOSS, UNSPECIFIED EYE: ICD-10-CM

## 2022-11-10 ENCOUNTER — APPOINTMENT (OUTPATIENT)
Dept: MRI IMAGING | Facility: CLINIC | Age: 72
End: 2022-11-10
Payer: MEDICARE

## 2022-11-10 PROCEDURE — A9585: CPT | Mod: JW

## 2022-11-10 PROCEDURE — 70551 MRI BRAIN STEM W/O DYE: CPT | Mod: MH

## 2022-11-10 PROCEDURE — 70544 MR ANGIOGRAPHY HEAD W/O DYE: CPT | Mod: MH,59

## 2022-11-10 PROCEDURE — 70548 MR ANGIOGRAPHY NECK W/DYE: CPT | Mod: MH

## 2022-11-14 ENCOUNTER — MED ADMIN CHARGE (OUTPATIENT)
Age: 72
End: 2022-11-14

## 2022-11-14 ENCOUNTER — APPOINTMENT (OUTPATIENT)
Dept: CARDIOLOGY | Facility: CLINIC | Age: 72
End: 2022-11-14

## 2022-11-14 PROCEDURE — 93306 TTE W/DOPPLER COMPLETE: CPT

## 2022-11-14 RX ORDER — PERFLUTREN 6.52 MG/ML
6.52 INJECTION, SUSPENSION INTRAVENOUS
Qty: 1 | Refills: 0 | Status: COMPLETED | OUTPATIENT
Start: 2022-11-14

## 2022-11-14 RX ADMIN — PERFLUTREN MG/ML: 6.52 INJECTION, SUSPENSION INTRAVENOUS at 00:00

## 2022-11-15 ENCOUNTER — APPOINTMENT (OUTPATIENT)
Dept: NEUROLOGY | Facility: CLINIC | Age: 72
End: 2022-11-15
Payer: MEDICARE

## 2022-11-15 VITALS
HEART RATE: 61 BPM | WEIGHT: 150 LBS | DIASTOLIC BLOOD PRESSURE: 89 MMHG | HEIGHT: 68 IN | SYSTOLIC BLOOD PRESSURE: 162 MMHG | BODY MASS INDEX: 22.73 KG/M2

## 2022-11-15 DIAGNOSIS — I65.21 OCCLUSION AND STENOSIS OF RIGHT CAROTID ARTERY: ICD-10-CM

## 2022-11-15 DIAGNOSIS — G45.3 AMAUROSIS FUGAX: ICD-10-CM

## 2022-11-15 PROCEDURE — 99205 OFFICE O/P NEW HI 60 MIN: CPT

## 2022-11-15 RX ORDER — ASPIRIN ENTERIC COATED TABLETS 81 MG 81 MG/1
81 TABLET, DELAYED RELEASE ORAL
Refills: 0 | Status: DISCONTINUED | COMMUNITY
End: 2022-11-15

## 2022-11-15 NOTE — DISCUSSION/SUMMARY
[FreeTextEntry1] : Mr. 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 Neuroimaging reviewed with patient. We discussed undergoing a cerebral angiogram to confirm the right carotid occlusion. If there is a string sign I would recommend surgery to treat the carotid. However, the chance is low, and I think doing nothing further is also a good option for him. The procedure, risks, benefits and alternatives discussed with patient and wife. The patient will call if he would like to proceed. Patient is a current everyday smoker, smoking cessation education provided, but does not feel ready to quit. All of their questions and concerns were addressed.

## 2022-11-15 NOTE — PHYSICAL EXAM
[General Appearance - Alert] : alert [General Appearance - Well Nourished] : well nourished [General Appearance - Well Developed] : well developed [Oriented To Time, Place, And Person] : oriented to person, place, and time [Affect] : the affect was normal [Mood] : the mood was normal [Person] : oriented to person [Place] : oriented to place [Time] : oriented to time [Concentration Intact] : normal concentrating ability [Visual Intact] : visual attention was ~T not ~L decreased [Naming Objects] : no difficulty naming common objects [Repeating Phrases] : no difficulty repeating a phrase [Writing A Sentence] : no difficulty writing a sentence [Fluency] : fluency intact [Comprehension] : comprehension intact [Reading] : reading intact [Past History] : adequate knowledge of personal past history [Cranial Nerves Oculomotor (III)] : extraocular motion intact [Cranial Nerves Trigeminal (V)] : facial sensation intact symmetrically [Cranial Nerves Facial (VII)] : face symmetrical [Cranial Nerves Vestibulocochlear (VIII)] : hearing was intact bilaterally [Cranial Nerves Glossopharyngeal (IX)] : tongue and palate midline [Cranial Nerves Accessory (XI - Cranial And Spinal)] : head turning and shoulder shrug symmetric [Cranial Nerves Hypoglossal (XII)] : there was no tongue deviation with protrusion [Motor Tone] : muscle tone was normal in all four extremities [Motor Strength] : muscle strength was normal in all four extremities [No Muscle Atrophy] : normal bulk in all four extremities [Motor Handedness Right-Handed] : the patient is right hand dominant [Sensation Tactile Decrease] : light touch was intact [Balance] : balance was intact [Extraocular Movements] : extraocular movements were intact [Hearing Threshold Finger Rub Not Dolores] : hearing was normal [Neck Appearance] : the appearance of the neck was normal [] : no respiratory distress [Heart Rate And Rhythm] : heart rate was normal and rhythm regular [Edema] : there was no peripheral edema [Abdomen Soft] : soft [Abnormal Walk] : normal gait [Skin Color & Pigmentation] : normal skin color and pigmentation [Paresis Pronator Drift Right-Sided] : no pronator drift on the right [Paresis Pronator Drift Left-Sided] : no pronator drift on the left [Motor Strength Upper Extremities Bilaterally] : strength was normal in both upper extremities [Motor Strength Lower Extremities Bilaterally] : strength was normal in both lower extremities

## 2022-11-15 NOTE — REVIEW OF SYSTEMS
[Eyesight Problems] : eyesight problems [Fever] : no fever [Chills] : no chills [Feeling Poorly] : not feeling poorly [Feeling Tired] : not feeling tired [Suicidal] : not suicidal [Anxiety] : no anxiety [Depression] : no depression [Confused or Disoriented] : no confusion [Memory Lapses or Loss] : no memory loss [Decr. Concentrating Ability] : no decrease in concentrating ability [Difficulty with Language] : no ~M difficulty with language [Changed Thought Patterns] : no change in thought patterns [Repeating Questions] : no repeated questioning about recent events [Facial Weakness] : no facial weakness [Arm Weakness] : no arm weakness [Leg Weakness] : no leg weakness [Poor Coordination] : good coordination [Difficulty Writing] : no difficulty writing [Difficulties in Speech] : no speech difficulties [Numbness] : no numbness [Tingling] : no tingling [Seizures] : no convulsions [Dizziness] : no dizziness [Fainting] : no fainting [Lightheadedness] : no lightheadedness [Vertigo] : no vertigo [Tension Headache] : no tension-type headache [Difficulty Walking] : no difficulty walking [Loss Of Hearing] : no hearing loss [Nosebleeds] : no nosebleeds [Chest Pain] : no chest pain [Palpitations] : no palpitations [Shortness Of Breath] : no shortness of breath [Wheezing] : no wheezing [Vomiting] : no vomiting [Incontinence] : no incontinence [Joint Pain] : no joint pain [Easy Bleeding] : no tendency for easy bleeding [Easy Bruising] : no tendency for easy bruising

## 2022-11-15 NOTE — HISTORY OF PRESENT ILLNESS
[FreeTextEntry1] : Mr. Villela is a 71 year old man with a PMHx of CAD s/p MI, carotid disease, current everyday smoker referred by Dr. Norton for recent imaging which showed right carotid occlusion. Patient had an episode of right eye vision loss, seen by his ophthalmologist and was diagnosed with central retinal artery occlusion. MRI Head showed no acute infarct. MRA Head and Neck done which a right carotid occlusion. All neuroimaging reviewed personally by me. He is on Plavix and Lipitor for stroke prevention. \par

## 2022-11-15 NOTE — CONSULT LETTER
[Dear  ___] : Dear  [unfilled], [Consult Letter:] : I had the pleasure of evaluating your patient, [unfilled]. [( Thank you for referring [unfilled] for consultation for _____ )] : Thank you for referring [unfilled] for consultation for [unfilled] [Please see my note below.] : Please see my note below. [Sincerely,] : Sincerely, [FreeTextEntry3] : Simone Knox MD\par Chief, Vascular Neurology and Neurology Service , NeuroEndovascular Surgery\par  of Neurology and Radiology\par [facility]\par Director, Comprehensive Stroke Center and Stroke Unit\par [facility]\par Director, NeuroEndovascular Surgery\par [facility]

## 2023-01-19 ENCOUNTER — APPOINTMENT (OUTPATIENT)
Dept: CARDIOLOGY | Facility: CLINIC | Age: 73
End: 2023-01-19
Payer: MEDICARE

## 2023-01-19 ENCOUNTER — NON-APPOINTMENT (OUTPATIENT)
Age: 73
End: 2023-01-19

## 2023-01-19 VITALS — DIASTOLIC BLOOD PRESSURE: 74 MMHG | SYSTOLIC BLOOD PRESSURE: 145 MMHG

## 2023-01-19 VITALS
WEIGHT: 145 LBS | SYSTOLIC BLOOD PRESSURE: 160 MMHG | OXYGEN SATURATION: 98 % | HEART RATE: 68 BPM | DIASTOLIC BLOOD PRESSURE: 76 MMHG | HEIGHT: 68 IN | BODY MASS INDEX: 21.98 KG/M2

## 2023-01-19 DIAGNOSIS — I10 ESSENTIAL (PRIMARY) HYPERTENSION: ICD-10-CM

## 2023-01-19 DIAGNOSIS — I25.10 ATHEROSCLEROTIC HEART DISEASE OF NATIVE CORONARY ARTERY W/OUT ANGINA PECTORIS: ICD-10-CM

## 2023-01-19 DIAGNOSIS — I65.29 OCCLUSION AND STENOSIS OF UNSPECIFIED CAROTID ARTERY: ICD-10-CM

## 2023-01-19 PROCEDURE — 93000 ELECTROCARDIOGRAM COMPLETE: CPT

## 2023-01-19 PROCEDURE — 99214 OFFICE O/P EST MOD 30 MIN: CPT

## 2023-01-19 RX ORDER — ATORVASTATIN CALCIUM 40 MG/1
40 TABLET, FILM COATED ORAL
Qty: 90 | Refills: 2 | Status: DISCONTINUED | COMMUNITY
Start: 2017-10-25 | End: 2023-01-19

## 2023-01-19 NOTE — DISCUSSION/SUMMARY
[FreeTextEntry1] : 72 year man with a history as listed presents for a followup cardiac evaluation. \par Srinivasan has a chronic occluded carotid which will be medically managed. \par He denies any anginal symptoms. Clinically he is euvolemic on exam. His EKG did not reveal any significant ischemic changes. Continue with Plavix.  He will stop ASA. He will undergo a nuclear stress test to assess for underlying obstructive CAD. \par His blood pressure is uncontrolled. He will continue with Atenolol 25mg qday. He will add losartan 25mg Qday. He will have his baseline lab work, including lipids, done prior to the next visit. \par He will change his Lipitor to Crestor 40mg QHS. \par Exercise and diet counseling was performed in order to reduce her future cardiovascular risk. \par Smoking cessation counseling was performed. \par He will followup with me in  3 months  or sooner if necessary. \par   [EKG obtained to assist in diagnosis and management of assessed problem(s)] : EKG obtained to assist in diagnosis and management of assessed problem(s)

## 2023-01-19 NOTE — HISTORY OF PRESENT ILLNESS
[FreeTextEntry1] : 72 year old man with a history CAd s/p MI in 1990 s/p TPA, s/p PCI 1991 at [facility] in [facility], 2006 at [facility] with PCI to LAD,  closed RCA, carotid disease, HTN, HLD, tobacco. \par \par He is here for a followup visit. \par Since his last visit he went to see Dr. Norton and Dr. Knox. He has decided not go forward with a carotid angiogram. \par He still has been very stressed. he still has MILLAN especially with carrying up the stairs. He is still activ with walking the dog.   He denies any chest pain, PND, orthopnea, lower extremity edema, near syncope, syncope, strokelike symptoms.  He is complaint with his medications.

## 2023-01-19 NOTE — CARDIOLOGY SUMMARY
[de-identified] : Sinus  Rhythm \par -  Nonspecific T-abnormality.  [de-identified] : 6/2019 normal LV function.\par 7/2021 normal LV function. \par 11/2022 normal LV function [de-identified] : 7/2021 carotid disease RCA severe atherosclerosis w 80-99%

## 2023-02-01 ENCOUNTER — NON-APPOINTMENT (OUTPATIENT)
Age: 73
End: 2023-02-01

## 2023-02-03 ENCOUNTER — APPOINTMENT (OUTPATIENT)
Dept: CARDIOLOGY | Facility: CLINIC | Age: 73
End: 2023-02-03

## 2023-04-05 ENCOUNTER — NON-APPOINTMENT (OUTPATIENT)
Age: 73
End: 2023-04-05

## 2023-04-05 RX ORDER — ROSUVASTATIN CALCIUM 40 MG/1
40 TABLET, FILM COATED ORAL DAILY
Qty: 90 | Refills: 3 | Status: DISCONTINUED | COMMUNITY
Start: 2023-01-19 | End: 2023-04-05

## 2023-04-05 RX ORDER — LOSARTAN POTASSIUM 25 MG/1
25 TABLET, FILM COATED ORAL DAILY
Qty: 90 | Refills: 1 | Status: DISCONTINUED | COMMUNITY
Start: 2023-01-19 | End: 2023-04-05

## 2023-04-27 ENCOUNTER — APPOINTMENT (OUTPATIENT)
Dept: CARDIOLOGY | Facility: CLINIC | Age: 73
End: 2023-04-27

## 2023-07-28 ENCOUNTER — RX RENEWAL (OUTPATIENT)
Age: 73
End: 2023-07-28

## 2023-08-28 NOTE — HISTORY OF PRESENT ILLNESS
[FreeTextEntry1] : 70 year old man with a history CAd s/p MI in 1990 s/p TPA, s/p PCI 1991 at [facility] in [facility], 2006 at [facility] with PCI to LAD,  closed RCA, carotid disease, HTN, HLD, tobacco. \par He had an echo in 6/19/2019  that showed normal systolic LV function without any significant other findings, including no significant valvular disease. \par \par He is here for a followup visit. He was last here in 9/2019.\par Since his last visit he has been feeling well.  He has not gotten his stress test. He is now vaccinated against COVID.  he walks with his dogs. He complains of dyspnea on exertion with strenuous exertion.  He denies any chest pain, PND, orthopnea, lower extremity edema, near syncope, syncope, strokelike symptoms.  he is still dealing the health issues with his grandson. He is complaint with his medications.  Stelara Counseling:  I discussed with the patient the risks of ustekinumab including but not limited to immunosuppression, malignancy, posterior leukoencephalopathy syndrome, and serious infections.  The patient understands that monitoring is required including a PPD at baseline and must alert us or the primary physician if symptoms of infection or other concerning signs are noted.

## 2023-09-13 ENCOUNTER — RX RENEWAL (OUTPATIENT)
Age: 73
End: 2023-09-13

## 2024-04-05 ENCOUNTER — RX RENEWAL (OUTPATIENT)
Age: 74
End: 2024-04-05

## 2024-04-23 ENCOUNTER — RX RENEWAL (OUTPATIENT)
Age: 74
End: 2024-04-23

## 2024-04-24 RX ORDER — CLOPIDOGREL BISULFATE 75 MG/1
75 TABLET, FILM COATED ORAL
Qty: 90 | Refills: 0 | Status: ACTIVE | COMMUNITY
Start: 2017-12-18

## 2024-04-24 RX ORDER — ATENOLOL 25 MG/1
25 TABLET ORAL
Qty: 90 | Refills: 0 | Status: ACTIVE | COMMUNITY
Start: 2017-07-18

## 2024-04-24 RX ORDER — ATORVASTATIN CALCIUM 40 MG/1
40 TABLET, FILM COATED ORAL
Qty: 90 | Refills: 0 | Status: ACTIVE | COMMUNITY
Start: 2023-04-05

## 2024-07-22 ENCOUNTER — RX RENEWAL (OUTPATIENT)
Age: 74
End: 2024-07-22

## 2024-08-19 ENCOUNTER — RX RENEWAL (OUTPATIENT)
Age: 74
End: 2024-08-19

## 2024-08-29 ENCOUNTER — NON-APPOINTMENT (OUTPATIENT)
Age: 74
End: 2024-08-29

## 2024-08-29 ENCOUNTER — APPOINTMENT (OUTPATIENT)
Dept: CARDIOLOGY | Facility: CLINIC | Age: 74
End: 2024-08-29

## 2024-08-29 VITALS
WEIGHT: 138 LBS | OXYGEN SATURATION: 97 % | DIASTOLIC BLOOD PRESSURE: 85 MMHG | BODY MASS INDEX: 20.92 KG/M2 | HEART RATE: 74 BPM | HEIGHT: 68 IN | SYSTOLIC BLOOD PRESSURE: 132 MMHG

## 2024-08-29 DIAGNOSIS — I25.10 ATHEROSCLEROTIC HEART DISEASE OF NATIVE CORONARY ARTERY W/OUT ANGINA PECTORIS: ICD-10-CM

## 2024-08-29 PROCEDURE — 99214 OFFICE O/P EST MOD 30 MIN: CPT | Mod: 25

## 2024-08-29 PROCEDURE — 93000 ELECTROCARDIOGRAM COMPLETE: CPT

## 2024-08-29 PROCEDURE — 99406 BEHAV CHNG SMOKING 3-10 MIN: CPT

## 2024-08-29 NOTE — DISCUSSION/SUMMARY
[FreeTextEntry1] : 73 year man with a history as listed presents for a followup cardiac evaluation.  Srinivasan has nonspecific St changes on his EKG. He never went for his stres test. He has agreed to do it now.  He will stop ASA. He will undergo a nuclear stress test to assess for underlying obstructive CAD. Continue with Plavix.   a chronic occluded carotid which will be medically managed.    His blood pressure is controlled. He will staff off the Atenolol 25mg qday and is reluctant to start of a BB. he does not want an ACE/ARB.  He will continue Lipitor 40mg QHS. He will continue with statin therapy to achieve maintain goal LDL<100 or ideally <70.  Exercise and diet counseling was performed in order to reduce her future cardiovascular risk.  He will have his baseline lab work, including lipids, done prior to the next visit.  Smoking cessation counseling was performed.  He will followup with me in  6 months  or sooner if necessary.    [EKG obtained to assist in diagnosis and management of assessed problem(s)] : EKG obtained to assist in diagnosis and management of assessed problem(s)

## 2024-08-29 NOTE — HISTORY OF PRESENT ILLNESS
[FreeTextEntry1] : 73 year old man with a history CAd s/p MI in 1990 s/p TPA, s/p PCI 1991 at [facility] in [facility], 2006 at [facility] with PCI to LAD, closed RCA, carotid disease, HTN, HLD, tobacco.   He was last here in 1/2023.  He is here for a follow-up visit.  Since his last visit he has been feeling well.  he is walking the dog daily. He denies any worsening dyspnea.   He denies any chest pain, PND, orthopnea, lower extremity edema, near syncope, syncope, stroke like symptoms.   He stop his Atenolol because of a risk of worsening his psoriasis. He stopped it 2 days ago.

## 2024-08-29 NOTE — PHYSICAL EXAM
[Well Groomed] : well groomed [General Appearance - In No Acute Distress] : no acute distress [Normal Conjunctiva] : the conjunctiva exhibited no abnormalities [Eyelids - No Xanthelasma] : the eyelids demonstrated no xanthelasmas [Normal Oral Mucosa] : normal oral mucosa [No Oral Pallor] : no oral pallor [No Oral Cyanosis] : no oral cyanosis [Respiration, Rhythm And Depth] : normal respiratory rhythm and effort [Exaggerated Use Of Accessory Muscles For Inspiration] : no accessory muscle use [Auscultation Breath Sounds / Voice Sounds] : lungs were clear to auscultation bilaterally [Abdomen Soft] : soft [Abdomen Tenderness] : non-tender [Abdomen Mass (___ Cm)] : no abdominal mass palpated [Abnormal Walk] : normal gait [Gait - Sufficient For Exercise Testing] : the gait was sufficient for exercise testing [Cyanosis, Localized] : no localized cyanosis [Nail Clubbing] : no clubbing of the fingernails [Petechial Hemorrhages (___cm)] : no petechial hemorrhages [] : no ischemic changes [Oriented To Time, Place, And Person] : oriented to person, place, and time [Affect] : the affect was normal [Mood] : the mood was normal [No Anxiety] : not feeling anxious [Normal Rate] : normal [Rhythm Regular] : regular [Normal S1] : normal S1 [Normal S2] : normal S2 [No Gallop] : no gallop heard [No Murmur] : no murmurs heard [1+] : Carotid: right 1+ [2+] : left 2+ [No Pitting Edema] : no pitting edema present [FreeTextEntry1] : psoriasis  [Right Carotid Bruit] : no bruit heard over the right carotid [Left Carotid Bruit] : no bruit heard over the left carotid [Bruit] : no bruit heard

## 2024-08-29 NOTE — CARDIOLOGY SUMMARY
[de-identified] : Sinus Rhythm  -Nonspecific ST depression -Nondiagnostic. [de-identified] : 6/2019 normal LV function.\par  7/2021 normal LV function. \par  11/2022 normal LV function [de-identified] : 7/2021 carotid disease RCA severe atherosclerosis w 80-99%

## 2024-11-07 RX ORDER — ASPIRIN 81 MG/1
81 TABLET ORAL
Refills: 0 | Status: ACTIVE | COMMUNITY
Start: 2024-11-07

## 2024-11-11 ENCOUNTER — APPOINTMENT (OUTPATIENT)
Dept: CARDIOLOGY | Facility: CLINIC | Age: 74
End: 2024-11-11

## 2025-07-02 ENCOUNTER — NON-APPOINTMENT (OUTPATIENT)
Age: 75
End: 2025-07-02

## 2025-07-03 ENCOUNTER — APPOINTMENT (OUTPATIENT)
Dept: CARDIOLOGY | Facility: CLINIC | Age: 75
End: 2025-07-03
Payer: MEDICARE

## 2025-07-03 VITALS
OXYGEN SATURATION: 97 % | HEIGHT: 68 IN | BODY MASS INDEX: 21.22 KG/M2 | HEART RATE: 72 BPM | WEIGHT: 140 LBS | DIASTOLIC BLOOD PRESSURE: 84 MMHG | SYSTOLIC BLOOD PRESSURE: 150 MMHG

## 2025-07-03 VITALS — DIASTOLIC BLOOD PRESSURE: 86 MMHG | SYSTOLIC BLOOD PRESSURE: 142 MMHG

## 2025-07-03 PROBLEM — H34.9 RETINAL ARTERY OCCLUSION: Status: ACTIVE | Noted: 2025-07-03

## 2025-07-03 PROCEDURE — G2211 COMPLEX E/M VISIT ADD ON: CPT

## 2025-07-03 PROCEDURE — 93000 ELECTROCARDIOGRAM COMPLETE: CPT

## 2025-07-03 PROCEDURE — 99214 OFFICE O/P EST MOD 30 MIN: CPT

## 2025-07-03 RX ORDER — CLOPIDOGREL BISULFATE 75 MG/1
75 TABLET, FILM COATED ORAL DAILY
Qty: 90 | Refills: 3 | Status: ACTIVE | COMMUNITY
Start: 2025-07-03 | End: 1900-01-01

## 2025-07-03 RX ORDER — AMLODIPINE BESYLATE 2.5 MG/1
2.5 TABLET ORAL
Qty: 90 | Refills: 3 | Status: ACTIVE | COMMUNITY
Start: 2025-07-03 | End: 1900-01-01

## 2025-07-24 ENCOUNTER — APPOINTMENT (OUTPATIENT)
Dept: VASCULAR SURGERY | Facility: CLINIC | Age: 75
End: 2025-07-24
Payer: MEDICARE

## 2025-07-24 VITALS — HEART RATE: 66 BPM | SYSTOLIC BLOOD PRESSURE: 156 MMHG | DIASTOLIC BLOOD PRESSURE: 89 MMHG

## 2025-07-24 VITALS
SYSTOLIC BLOOD PRESSURE: 166 MMHG | HEIGHT: 68 IN | WEIGHT: 140 LBS | BODY MASS INDEX: 21.22 KG/M2 | TEMPERATURE: 97.9 F | HEART RATE: 71 BPM | DIASTOLIC BLOOD PRESSURE: 79 MMHG

## 2025-07-24 DIAGNOSIS — I65.21 OCCLUSION AND STENOSIS OF RIGHT CAROTID ARTERY: ICD-10-CM

## 2025-07-24 PROCEDURE — 99204 OFFICE O/P NEW MOD 45 MIN: CPT

## 2025-07-24 PROCEDURE — 93880 EXTRACRANIAL BILAT STUDY: CPT

## 2025-07-30 ENCOUNTER — APPOINTMENT (OUTPATIENT)
Dept: CARDIOLOGY | Facility: CLINIC | Age: 75
End: 2025-07-30